# Patient Record
Sex: FEMALE | Race: WHITE | NOT HISPANIC OR LATINO | Employment: UNEMPLOYED | URBAN - METROPOLITAN AREA
[De-identification: names, ages, dates, MRNs, and addresses within clinical notes are randomized per-mention and may not be internally consistent; named-entity substitution may affect disease eponyms.]

---

## 2024-11-07 NOTE — PROGRESS NOTES
[facility] - Breast [place] [facility]  Breast Surgical Oncology  New Patient Office Visit - H&P      Referring Provider: Dr. Deven Li   PCP: No, Primary Doctor     Patient Care Team:  Mira, Primary Doctor as PCP - Avelina Walker MD as Consulting Physician (Breast Surgery)  Prabhjot Sagastume MD (Obstetrics and Gynecology)      Chief Complaint:   Chief Complaint   Patient presents with    Breast Cancer     Patient denies any breast pain, swelling, nipple discharge/inversion, skin discoloration      Subjective:       HPI:  Elissa Phillips is a 59 y.o. female who presents on 11/12/2024 for establishing care for history of right breast ER/MN positive DCIS diagnosed in 5/2017 after she presented for her baseline screening mammogram.  She is s/p right mastectomy on 7/10/2017 per Dr. Stafford and has since followed with Dr. Li for breast cancer surveillance visits. She currently denies any breast issues including rashes, redness, pain, swelling, nipple discharge, or new lumps/masses.  Patient's history also includes a DVT and PE which occurred 2 weeks following her mastectomy. She had to have a percutaneous cardiovascular procedure to resolve the clot.  Patient was referred to Dr. Woodruff, and they decided against adjuvant endocrine therapy.  Eliquis was discontinued in 2019. She declined reconstruction due to the complication of blood clots after her last surgery.     Her breast history also a benign left breast mass in the 10:00 position 10 cmfn which was first seen on breast imaging in July of 2021.  Patient reports a history of a fall 2-3 weeks prior to this imaging and it was felt that it could represent fat necrosis. She was given the option of needle biopsy with clip placement which was performed on 8/6/21 by her surgeon. Pathology confirmed benign fat necrosis.  Follow-up imaging of this area after the biopsy revealed evolving calcifications near this clip which were most  compatible with evolving fat necrosis. Short interval imaging was again recommended and these changes were found to remain benign-appearing on follow ups.      Patient has a family history of breast cancer in multiple family members including her mother  from breast cancer at age 49. The patient did have genetic testing drawn at [facility] on 7/10/2017 but this was never processed and she never had results.     MG breast density: Category B(scattered areas of fibroglandular tissue)     [id number]  BL SC MG at [facility]   Impression BIRADS-2 Benign  No mammographic evidence of malignancy in the left breast.     Pathology:      OB/GYN History:  Age at Menarche Onset: unknown  Menopausal Status: postmenopausal, LMP: No LMP recorded. Patient is postmenopausal.  Hysterectomy/Oophorectomy: menopause, at age 55  Hormonal birth control (duration): None.   Pregnancy History:   Age at first live birth: 19  Hormone Replacement Therapy: No, none    Other Relevant History:  Prior thoracic RT: none  Genetic testing: none  Ashkenazi Religious descent: No    Family History:  Family History   Problem Relation Name Age of Onset    Breast cancer Mother  56    Breast cancer Sister          Left Lumpecttomy    Breast cancer Maternal Grandmother          Past History:  Past Medical History:   Diagnosis Date    Breasst Cancer (DCIS)     Right Breast (2016 or 2017)        Past Surgical History:   Procedure Laterality Date    BREAST SURGERY Right     Right Mastectomy        Social History     Socioeconomic History    Marital status:    Tobacco Use    Smoking status: Never     Passive exposure: Past (mother was a smoker)    Smokeless tobacco: Never   Substance and Sexual Activity    Alcohol use: Never    Drug use: Never        Body mass index is 39.67 kg/m².     Allergy/Medications:   Review of patient's allergies indicates:   Allergen Reactions    Gadolinium-containing contrast media Shortness Of Breath  "and Swelling     Swelling -lips        No current outpatient medications on file.       Review of Systems:  ROS       Objective:     Vitals:  Blood pressure 128/85, pulse 95, temperature 97.4 °F (36.3 °C), temperature source Oral, resp. rate 20, height 5' 9" (1.753 m), weight 121.8 kg (268 lb 9.6 oz), SpO2 95%.      Physical Exam:  General: The patient is awake, alert and oriented times three. The patient is well nourished and in no acute distress.   Neck: There is no evidence of palpable cervical, supraclavicular or axillary adenopathy. The neck is supple. The thyroid is not enlarged.   Musculoskeletal: The patient has a normal range of motion of her bilateral upper extremities.   Chest: Examination of the chest wall fails to reveal any obvious abnormalities. The lungs are clear to auscultation bilaterally without rales, rhonchi, or wheezing.   Cardiovascular: The heart has a regular rate and rhythm without murmurs, gallops or rubs.  Breast:  Right: Examination of the Right mastectomy fails to reveal any dominant masses or areas of significant focal nodularity. The nipple is surgically absent. There are no significant skin changes overlying the breasts. There is no skin dimpling with movement of the pectoralis.   Left: Examination of the left breast fails to reveal any dominant masses or areas of significant focal nodularity. The nipple is everted without evidence of discharge. There is no skin dimpling with movement of the pectoralis. There are no significant skin changes overlying the breast.  Abdomen: The abdomen is soft, flat, nontender and nondistended with no palpable masses or organomegaly.  Integumentary: no rashes or skin lesions present  Neurologic: cranial nerves intact, no signs of peripheral neurological deficit, motor/sensory function intact    Assessment and Discussion:      Cancer Staging   No matching staging information was found for the patient.        Encounter Diagnoses   Name Primary?    " Screening mammogram, encounter for Yes    History of ductal carcinoma in situ (DCIS) of breast          Patient presents today to establish care for breast cancer surveillance.  She is doing well and has no breast concerns today.  She is up-to-date on breast screening.  Her history was reviewed extensively and we discussed plan for annual follow-up for clinical breast exam and to continue left screening mammograms.  Patient is in agreement with this plan.     Plan:     Left screening mammogram recommended in April.    RTC in 6 months for CBE.  After this appointment we will begin annual follow up.    Patient meets NCCN criteria for genetic testing.  Patient would like to consider this testing and agreed to referral to genetic counselor.  Referral placed.  Patient would prefer telehealth visit.    Healthy lifestyle guidelines were reviewed. She was encouraged to engage in regular exercise, maintain a healthy body weight, and avoid excessive alcohol consumption. Healthy nutritional guidelines were also discussed. Self-breast examination was reviewed with the patient in detail and she was encouraged to perform this on a monthly basis.    Request: All prior pathology reports and Op notes from [facility]         All of her questions were answered. She was advised to call if she develops any questions or concerns.    Roya Mendez PA-C         --------------------------------------------------------------------------------------------------------------  Total time on the date of the visit ranged from 60-74 mins (58631). Total time includes both face-to-face and non-face-to-face time personally spent by myself on the day of the visit.    Non-face-to-face time included:  _X_ preparing to see the patient such as reviewing the patient record  _X_ obtaining and reviewing separately obtained history  _X_ independently interpreting results  _X_ documenting clinical information in electronic health  record.    Face-to-face time included:  _X_ performing an appropriate history and examination  _X_ communicating results to the patient  _X_ counseling and educating the patient  __ ordering appropriate medications  _x_ ordering appropriate tests  _X_ ordering appropriate procedures (including follow-up)  _X_ answering any questions the patient had    Total Time spent on date of visit: 65 minutes

## 2024-11-12 ENCOUNTER — OFFICE VISIT (OUTPATIENT)
Dept: SURGERY | Facility: CLINIC | Age: 59
End: 2024-11-12
Payer: COMMERCIAL

## 2024-11-12 VITALS
BODY MASS INDEX: 39.79 KG/M2 | WEIGHT: 268.63 LBS | HEIGHT: 69 IN | RESPIRATION RATE: 20 BRPM | HEART RATE: 95 BPM | OXYGEN SATURATION: 95 % | DIASTOLIC BLOOD PRESSURE: 85 MMHG | TEMPERATURE: 97 F | SYSTOLIC BLOOD PRESSURE: 128 MMHG

## 2024-11-12 DIAGNOSIS — Z12.31 SCREENING MAMMOGRAM, ENCOUNTER FOR: Primary | ICD-10-CM

## 2024-11-12 DIAGNOSIS — D05.11 DUCTAL CARCINOMA IN SITU (DCIS) OF RIGHT BREAST: ICD-10-CM

## 2024-11-12 DIAGNOSIS — Z86.000 HISTORY OF DUCTAL CARCINOMA IN SITU (DCIS) OF BREAST: ICD-10-CM

## 2024-11-12 PROCEDURE — 99999 PR PBB SHADOW E&M-EST. PATIENT-LVL IV: CPT | Mod: PBBFAC,,, | Performed by: PHYSICIAN ASSISTANT

## 2025-04-16 ENCOUNTER — RESULTS FOLLOW-UP (OUTPATIENT)
Dept: SURGERY | Facility: CLINIC | Age: 60
End: 2025-04-16

## 2025-04-16 ENCOUNTER — HOSPITAL ENCOUNTER (OUTPATIENT)
Dept: RADIOLOGY | Facility: HOSPITAL | Age: 60
Discharge: HOME | End: 2025-04-16
Attending: PHYSICIAN ASSISTANT
Payer: COMMERCIAL

## 2025-04-16 DIAGNOSIS — Z12.31 SCREENING MAMMOGRAM, ENCOUNTER FOR: ICD-10-CM

## 2025-04-16 PROCEDURE — 77067 SCR MAMMO BI INCL CAD: CPT | Mod: TC,52

## 2025-04-16 PROCEDURE — 77067 SCR MAMMO BI INCL CAD: CPT | Mod: 26,52,, | Performed by: RADIOLOGY

## 2025-04-16 PROCEDURE — 77063 BREAST TOMOSYNTHESIS BI: CPT | Mod: 26,52,, | Performed by: RADIOLOGY

## 2025-05-20 ENCOUNTER — OFFICE VISIT (OUTPATIENT)
Dept: SURGERY | Facility: CLINIC | Age: 60
End: 2025-05-20
Payer: COMMERCIAL

## 2025-05-20 VITALS
HEIGHT: 69 IN | TEMPERATURE: 98 F | DIASTOLIC BLOOD PRESSURE: 86 MMHG | WEIGHT: 278.81 LBS | HEART RATE: 85 BPM | RESPIRATION RATE: 20 BRPM | SYSTOLIC BLOOD PRESSURE: 134 MMHG | BODY MASS INDEX: 41.3 KG/M2 | OXYGEN SATURATION: 95 %

## 2025-05-20 DIAGNOSIS — Z12.31 SCREENING MAMMOGRAM, ENCOUNTER FOR: Primary | ICD-10-CM

## 2025-05-20 DIAGNOSIS — D05.11 DUCTAL CARCINOMA IN SITU (DCIS) OF RIGHT BREAST: ICD-10-CM

## 2025-05-20 DIAGNOSIS — N64.1 FAT NECROSIS OF LEFT BREAST: ICD-10-CM

## 2025-05-20 DIAGNOSIS — Z80.3 FAMILY HISTORY OF BREAST CANCER: ICD-10-CM

## 2025-05-20 DIAGNOSIS — N60.19 FIBROCYSTIC MASTOPATHY, UNSPECIFIED LATERALITY: ICD-10-CM

## 2025-05-20 DIAGNOSIS — Z86.000 HISTORY OF DUCTAL CARCINOMA IN SITU (DCIS) OF BREAST: ICD-10-CM

## 2025-05-20 PROCEDURE — 99214 OFFICE O/P EST MOD 30 MIN: CPT | Mod: S$GLB,,, | Performed by: PHYSICIAN ASSISTANT

## 2025-05-20 PROCEDURE — 1159F MED LIST DOCD IN RCRD: CPT | Mod: CPTII,S$GLB,, | Performed by: PHYSICIAN ASSISTANT

## 2025-05-20 PROCEDURE — 3008F BODY MASS INDEX DOCD: CPT | Mod: CPTII,S$GLB,, | Performed by: PHYSICIAN ASSISTANT

## 2025-05-20 PROCEDURE — 1160F RVW MEDS BY RX/DR IN RCRD: CPT | Mod: CPTII,S$GLB,, | Performed by: PHYSICIAN ASSISTANT

## 2025-05-20 PROCEDURE — 3079F DIAST BP 80-89 MM HG: CPT | Mod: CPTII,S$GLB,, | Performed by: PHYSICIAN ASSISTANT

## 2025-05-20 PROCEDURE — 3075F SYST BP GE 130 - 139MM HG: CPT | Mod: CPTII,S$GLB,, | Performed by: PHYSICIAN ASSISTANT

## 2025-05-20 PROCEDURE — 99999 PR PBB SHADOW E&M-EST. PATIENT-LVL IV: CPT | Mod: PBBFAC,,, | Performed by: PHYSICIAN ASSISTANT

## 2025-05-20 RX ORDER — ACETAMINOPHEN 325 MG/1
325 TABLET ORAL EVERY 6 HOURS PRN
COMMUNITY

## 2025-05-20 NOTE — PROGRESS NOTES
[facility]  Breast Surgical Oncology  New Patient Office Visit - H&P      Referring Provider: No ref. provider found   PCP: No, Primary Doctor     Patient Care Team:  No, Primary Doctor as PCP - General  Avelina Comer MD as Consulting Physician (Breast Surgery)  Prabhjot Sagastume MD (Obstetrics and Gynecology)  Roya Mendez PA as Physician Assistant (General Surgery)      Chief Complaint:   Chief Complaint   Patient presents with    Follow-up     Patient denies any breast symptoms      Subjective:       Interval History:  05/20/2025 - Elissa Phillips returns today for 6 mo f/u. She is doing well. L SCR MG was benign. Has no breast concerns.    HPI:  Elissa Phillips is a 59 y.o. female who presents on 5/20/2025 for establishing care for history of right breast ER/UT positive DCIS diagnosed in 5/2017 after she presented for her baseline screening mammogram.  She is s/p right mastectomy on 7/10/2017 per Dr. Stafford and has since followed with Dr. Li for breast cancer surveillance visits. She currently denies any breast issues including rashes, redness, pain, swelling, nipple discharge, or new lumps/masses.  Patient's history also includes a DVT and PE which occurred 2 weeks following her mastectomy. She had to have a percutaneous cardiovascular procedure to resolve the clot.  Patient was referred to Dr. Woodruff, and they decided against adjuvant endocrine therapy.  Eliquis was discontinued in 2019. She declined reconstruction due to the complication of blood clots after her last surgery.     Her breast history also a benign left breast mass in the 10:00 position 10 cmfn which was first seen on breast imaging in July of 2021.  Patient reports a history of a fall 2-3 weeks prior to this imaging and it was felt that it could represent fat necrosis. She was given the option of needle biopsy with clip placement which was performed on 8/6/21 by her surgeon.  Pathology confirmed benign fat necrosis.  Follow-up imaging of this area after the biopsy revealed evolving calcifications near this clip which were most compatible with evolving fat necrosis. Short interval imaging was again recommended and these changes were found to remain benign-appearing on follow ups.      Patient has a family history of breast cancer in multiple family members including her mother  from breast cancer at age 49. The patient did have genetic testing drawn at [facility] on 7/10/2017 but this was never processed and she never had results.     MG breast density: Category B(scattered areas of fibroglandular tissue)     [id number]  BL SC MG at [facility]   Impression BIRADS-2 Benign  No mammographic evidence of malignancy in the left breast.     Pathology:      OB/GYN History:  Age at Menarche Onset: unknown  Menopausal Status: postmenopausal, LMP: No LMP recorded. Patient is postmenopausal.  Hysterectomy/Oophorectomy: menopause, at age 55  Hormonal birth control (duration): None.   Pregnancy History:   Age at first live birth: 19  Hormone Replacement Therapy: No, none    Other Relevant History:  Prior thoracic RT: none  Genetic testing: none  Ashkenazi [place]ats[place] descent: No    Family History:  Family History   Problem Relation Name Age of Onset    Breast cancer Mother Rico Portillo 56    Cancer Mother Rico Portillo     Breast cancer Sister Citlali         Left Lumpecttomy    Breast cancer Maternal Grandmother Arminda Portillo         Past History:  Past Medical History:   Diagnosis Date    Breasst Cancer (DCIS)     Right Breast (2016 or 2017)    Menopause [id number]        Past Surgical History:   Procedure Laterality Date    BREAST SURGERY Right     Right Mastectomy        Social History     Socioeconomic History    Marital status:    Tobacco Use    Smoking status: Never     Passive exposure: Past (mother was a smoker)    Smokeless tobacco: Never   Substance and Sexual  "Activity    Alcohol use: Never    Drug use: Never    Sexual activity: Yes     Partners: Male     Birth control/protection: None        Body mass index is 41.17 kg/m².     Allergy/Medications:   Review of patient's allergies indicates:   Allergen Reactions    Gadolinium-containing contrast media Shortness Of Breath and Swelling     Swelling -lips          Current Outpatient Medications:     acetaminophen (TYLENOL) 325 MG tablet, Take 325 mg by mouth every 6 (six) hours as needed for Pain., Disp: , Rfl:     multivit-min/iron fum/folic ac (ONE-A-DAY WOMEN'S COMPLETE ORAL), Take by mouth., Disp: , Rfl:        Review of Systems:  ROS       Objective:     Vitals:  Blood pressure 134/86, pulse 85, temperature 98 °F (36.7 °C), temperature source Oral, resp. rate 20, height 5' 9" (1.753 m), weight 126.5 kg (278 lb 12.8 oz), SpO2 95%.      Physical Exam:  General: The patient is awake, alert and oriented times three. The patient is well nourished and in no acute distress.   Neck: There is no evidence of palpable cervical, supraclavicular or axillary adenopathy. The neck is supple. The thyroid is not enlarged.   Musculoskeletal: The patient has a normal range of motion of her bilateral upper extremities.   Chest: Examination of the chest wall fails to reveal any obvious abnormalities. The lungs are clear to auscultation bilaterally without rales, rhonchi, or wheezing.   Cardiovascular: The heart has a regular rate and rhythm without murmurs, gallops or rubs.  Breast:  Right: Examination of the Right mastectomy fails to reveal any dominant masses or areas of significant focal nodularity. The nipple is surgically absent. There are no significant skin changes overlying the breasts. There is no skin dimpling with movement of the pectoralis.   Left: Examination of the left breast fails to reveal any dominant masses or areas of significant focal nodularity. The nipple is everted without evidence of discharge. There is no skin " dimpling with movement of the pectoralis. There are no significant skin changes overlying the breast.  Abdomen: The abdomen is soft, flat, nontender and nondistended with no palpable masses or organomegaly.  Integumentary: no rashes or skin lesions present  Neurologic: cranial nerves intact, no signs of peripheral neurological deficit, motor/sensory function intact    Assessment and Discussion:      Cancer Staging   Ductal carcinoma in situ (DCIS) of right breast  Staging form: Breast, AJCC 8th Edition  - Clinical: No stage assigned - Unsigned        Encounter Diagnoses   Name Primary?    Screening mammogram, encounter for Yes           Patient presents today to establish care for breast cancer surveillance.  She is doing well and has no breast concerns today.  She is up-to-date on breast screening.  Her history was reviewed extensively and we discussed plan for annual follow-up for clinical breast exam and to continue left screening mammograms.      Plan:     Left screening mammogram recommended in April 2026    RTC in 1 year for CBE.      Patient meets NCCN criteria for genetic testing.  Patient has been busy lately and unable to make it to appointment with . She will call our office back when she is ready to reschedule with Boris.    Healthy lifestyle guidelines were reviewed. She was encouraged to engage in regular exercise, maintain a healthy body weight, and avoid excessive alcohol consumption. Healthy nutritional guidelines were also discussed. Self-breast examination was reviewed with the patient in detail and she was encouraged to perform this on a monthly basis.    ** Request: All prior pathology reports and Op notes [facility] **        All of her questions were answered. She was advised to call if she develops any questions or concerns.    Roya Mendez PA-C          --------------------------------------------------------------------------------------------------------    Total time on the date of the visit ranged from 30-39 mins (01486). Total time includes both face-to-face and non-face-to-face time personally spent by myself on the day of the visit.    Non-face-to-face time included:  _X_ preparing to see the patient such as reviewing the patient record  __ obtaining and reviewing separately obtained history  _X_ independently interpreting results  _X_ documenting clinical information in electronic health record.    Face-to-face time included:  _X_ performing an appropriate history and examination  _X_ communicating results to the patient  _X_ counseling and educating the patient  __ ordering appropriate medications  __ ordering appropriate tests  _X_ ordering appropriate procedures (including follow-up)  _X_ answering any questions the patient had    Total Time spent on date of visit: 35 minutes